# Patient Record
Sex: MALE | Race: WHITE | NOT HISPANIC OR LATINO | URBAN - METROPOLITAN AREA
[De-identification: names, ages, dates, MRNs, and addresses within clinical notes are randomized per-mention and may not be internally consistent; named-entity substitution may affect disease eponyms.]

---

## 2023-02-07 ENCOUNTER — INPATIENT (INPATIENT)
Facility: HOSPITAL | Age: 78
LOS: 0 days | Discharge: HOME | DRG: 287 | End: 2023-02-08
Attending: INTERNAL MEDICINE | Admitting: INTERNAL MEDICINE
Payer: COMMERCIAL

## 2023-02-07 VITALS
WEIGHT: 259.93 LBS | HEIGHT: 70 IN | TEMPERATURE: 98 F | HEART RATE: 48 BPM | DIASTOLIC BLOOD PRESSURE: 91 MMHG | SYSTOLIC BLOOD PRESSURE: 159 MMHG | OXYGEN SATURATION: 99 % | RESPIRATION RATE: 18 BRPM

## 2023-02-07 DIAGNOSIS — I20.0 UNSTABLE ANGINA: ICD-10-CM

## 2023-02-07 LAB
ALBUMIN SERPL ELPH-MCNC: 3.7 G/DL — SIGNIFICANT CHANGE UP (ref 3.3–5)
ALP SERPL-CCNC: 92 U/L — SIGNIFICANT CHANGE UP (ref 40–120)
ALT FLD-CCNC: 12 U/L — SIGNIFICANT CHANGE UP (ref 10–45)
ANION GAP SERPL CALC-SCNC: 9 MMOL/L — SIGNIFICANT CHANGE UP (ref 5–17)
APTT BLD: 27.3 SEC — LOW (ref 27.5–35.5)
AST SERPL-CCNC: 11 U/L — SIGNIFICANT CHANGE UP (ref 10–40)
BASOPHILS # BLD AUTO: 0.02 K/UL — SIGNIFICANT CHANGE UP (ref 0–0.2)
BASOPHILS NFR BLD AUTO: 0.2 % — SIGNIFICANT CHANGE UP (ref 0–2)
BILIRUB SERPL-MCNC: 1 MG/DL — SIGNIFICANT CHANGE UP (ref 0.2–1.2)
BUN SERPL-MCNC: 20 MG/DL — SIGNIFICANT CHANGE UP (ref 7–23)
CALCIUM SERPL-MCNC: 9.3 MG/DL — SIGNIFICANT CHANGE UP (ref 8.4–10.5)
CHLORIDE SERPL-SCNC: 98 MMOL/L — SIGNIFICANT CHANGE UP (ref 96–108)
CO2 SERPL-SCNC: 31 MMOL/L — SIGNIFICANT CHANGE UP (ref 22–31)
CREAT SERPL-MCNC: 1.16 MG/DL — SIGNIFICANT CHANGE UP (ref 0.5–1.3)
EGFR: 64 ML/MIN/1.73M2 — SIGNIFICANT CHANGE UP
EOSINOPHIL # BLD AUTO: 0.08 K/UL — SIGNIFICANT CHANGE UP (ref 0–0.5)
EOSINOPHIL NFR BLD AUTO: 0.9 % — SIGNIFICANT CHANGE UP (ref 0–6)
GLUCOSE BLDC GLUCOMTR-MCNC: 122 MG/DL — HIGH (ref 70–99)
GLUCOSE SERPL-MCNC: 92 MG/DL — SIGNIFICANT CHANGE UP (ref 70–99)
HCT VFR BLD CALC: 40.8 % — SIGNIFICANT CHANGE UP (ref 39–50)
HGB BLD-MCNC: 13.4 G/DL — SIGNIFICANT CHANGE UP (ref 13–17)
IMM GRANULOCYTES NFR BLD AUTO: 0.4 % — SIGNIFICANT CHANGE UP (ref 0–0.9)
INR BLD: 1.12 RATIO — SIGNIFICANT CHANGE UP (ref 0.88–1.16)
LIDOCAIN IGE QN: 11 U/L — SIGNIFICANT CHANGE UP (ref 7–60)
LYMPHOCYTES # BLD AUTO: 1.65 K/UL — SIGNIFICANT CHANGE UP (ref 1–3.3)
LYMPHOCYTES # BLD AUTO: 19.5 % — SIGNIFICANT CHANGE UP (ref 13–44)
MAGNESIUM SERPL-MCNC: 1.9 MG/DL — SIGNIFICANT CHANGE UP (ref 1.6–2.6)
MCHC RBC-ENTMCNC: 30.5 PG — SIGNIFICANT CHANGE UP (ref 27–34)
MCHC RBC-ENTMCNC: 32.8 GM/DL — SIGNIFICANT CHANGE UP (ref 32–36)
MCV RBC AUTO: 92.7 FL — SIGNIFICANT CHANGE UP (ref 80–100)
MONOCYTES # BLD AUTO: 0.76 K/UL — SIGNIFICANT CHANGE UP (ref 0–0.9)
MONOCYTES NFR BLD AUTO: 9 % — SIGNIFICANT CHANGE UP (ref 2–14)
NEUTROPHILS # BLD AUTO: 5.9 K/UL — SIGNIFICANT CHANGE UP (ref 1.8–7.4)
NEUTROPHILS NFR BLD AUTO: 70 % — SIGNIFICANT CHANGE UP (ref 43–77)
NRBC # BLD: 0 /100 WBCS — SIGNIFICANT CHANGE UP (ref 0–0)
NT-PROBNP SERPL-SCNC: 476 PG/ML — HIGH (ref 0–300)
PLATELET # BLD AUTO: 187 K/UL — SIGNIFICANT CHANGE UP (ref 150–400)
POTASSIUM SERPL-MCNC: 3.8 MMOL/L — SIGNIFICANT CHANGE UP (ref 3.5–5.3)
POTASSIUM SERPL-SCNC: 3.8 MMOL/L — SIGNIFICANT CHANGE UP (ref 3.5–5.3)
PROT SERPL-MCNC: 6.7 G/DL — SIGNIFICANT CHANGE UP (ref 6–8.3)
PROTHROM AB SERPL-ACNC: 13 SEC — SIGNIFICANT CHANGE UP (ref 10.5–13.4)
RBC # BLD: 4.4 M/UL — SIGNIFICANT CHANGE UP (ref 4.2–5.8)
RBC # FLD: 12.5 % — SIGNIFICANT CHANGE UP (ref 10.3–14.5)
SARS-COV-2 RNA SPEC QL NAA+PROBE: SIGNIFICANT CHANGE UP
SODIUM SERPL-SCNC: 138 MMOL/L — SIGNIFICANT CHANGE UP (ref 135–145)
TROPONIN T, HIGH SENSITIVITY RESULT: 13 NG/L — SIGNIFICANT CHANGE UP (ref 0–51)
WBC # BLD: 8.44 K/UL — SIGNIFICANT CHANGE UP (ref 3.8–10.5)
WBC # FLD AUTO: 8.44 K/UL — SIGNIFICANT CHANGE UP (ref 3.8–10.5)

## 2023-02-07 PROCEDURE — 99285 EMERGENCY DEPT VISIT HI MDM: CPT | Mod: FS

## 2023-02-07 PROCEDURE — 71045 X-RAY EXAM CHEST 1 VIEW: CPT | Mod: 26

## 2023-02-07 RX ORDER — ATORVASTATIN CALCIUM 80 MG/1
1 TABLET, FILM COATED ORAL
Qty: 0 | Refills: 0 | DISCHARGE

## 2023-02-07 RX ORDER — GLUCAGON INJECTION, SOLUTION 0.5 MG/.1ML
1 INJECTION, SOLUTION SUBCUTANEOUS ONCE
Refills: 0 | Status: DISCONTINUED | OUTPATIENT
Start: 2023-02-07 | End: 2023-02-08

## 2023-02-07 RX ORDER — ASPIRIN/CALCIUM CARB/MAGNESIUM 324 MG
162 TABLET ORAL ONCE
Refills: 0 | Status: COMPLETED | OUTPATIENT
Start: 2023-02-07 | End: 2023-02-07

## 2023-02-07 RX ORDER — TAMSULOSIN HYDROCHLORIDE 0.4 MG/1
1 CAPSULE ORAL
Qty: 0 | Refills: 0 | DISCHARGE

## 2023-02-07 RX ORDER — PANTOPRAZOLE SODIUM 20 MG/1
40 TABLET, DELAYED RELEASE ORAL
Refills: 0 | Status: DISCONTINUED | OUTPATIENT
Start: 2023-02-07 | End: 2023-02-08

## 2023-02-07 RX ORDER — BENZOCAINE AND MENTHOL 5; 1 G/100ML; G/100ML
1 LIQUID ORAL EVERY 6 HOURS
Refills: 0 | Status: DISCONTINUED | OUTPATIENT
Start: 2023-02-07 | End: 2023-02-08

## 2023-02-07 RX ORDER — SODIUM CHLORIDE 9 MG/ML
1000 INJECTION, SOLUTION INTRAVENOUS
Refills: 0 | Status: DISCONTINUED | OUTPATIENT
Start: 2023-02-07 | End: 2023-02-08

## 2023-02-07 RX ORDER — ALPRAZOLAM 0.25 MG
0.5 TABLET ORAL THREE TIMES A DAY
Refills: 0 | Status: DISCONTINUED | OUTPATIENT
Start: 2023-02-07 | End: 2023-02-08

## 2023-02-07 RX ORDER — INFLUENZA VIRUS VACCINE 15; 15; 15; 15 UG/.5ML; UG/.5ML; UG/.5ML; UG/.5ML
0.7 SUSPENSION INTRAMUSCULAR ONCE
Refills: 0 | Status: DISCONTINUED | OUTPATIENT
Start: 2023-02-07 | End: 2023-02-08

## 2023-02-07 RX ORDER — DEXTROSE 50 % IN WATER 50 %
25 SYRINGE (ML) INTRAVENOUS ONCE
Refills: 0 | Status: DISCONTINUED | OUTPATIENT
Start: 2023-02-07 | End: 2023-02-08

## 2023-02-07 RX ORDER — DESVENLAFAXINE 50 MG/1
50 TABLET, EXTENDED RELEASE ORAL DAILY
Refills: 0 | Status: DISCONTINUED | OUTPATIENT
Start: 2023-02-07 | End: 2023-02-08

## 2023-02-07 RX ORDER — AMLODIPINE BESYLATE 2.5 MG/1
1 TABLET ORAL
Qty: 0 | Refills: 0 | DISCHARGE

## 2023-02-07 RX ORDER — FINASTERIDE 5 MG/1
1 TABLET, FILM COATED ORAL
Qty: 0 | Refills: 0 | DISCHARGE

## 2023-02-07 RX ORDER — ATORVASTATIN CALCIUM 80 MG/1
20 TABLET, FILM COATED ORAL AT BEDTIME
Refills: 0 | Status: DISCONTINUED | OUTPATIENT
Start: 2023-02-07 | End: 2023-02-08

## 2023-02-07 RX ORDER — IPRATROPIUM/ALBUTEROL SULFATE 18-103MCG
3 AEROSOL WITH ADAPTER (GRAM) INHALATION ONCE
Refills: 0 | Status: COMPLETED | OUTPATIENT
Start: 2023-02-07 | End: 2023-02-07

## 2023-02-07 RX ORDER — DEXTROSE 50 % IN WATER 50 %
15 SYRINGE (ML) INTRAVENOUS ONCE
Refills: 0 | Status: DISCONTINUED | OUTPATIENT
Start: 2023-02-07 | End: 2023-02-08

## 2023-02-07 RX ORDER — DESVENLAFAXINE 50 MG/1
1 TABLET, EXTENDED RELEASE ORAL
Qty: 0 | Refills: 0 | DISCHARGE

## 2023-02-07 RX ORDER — DEXTROSE 50 % IN WATER 50 %
12.5 SYRINGE (ML) INTRAVENOUS ONCE
Refills: 0 | Status: DISCONTINUED | OUTPATIENT
Start: 2023-02-07 | End: 2023-02-08

## 2023-02-07 RX ORDER — TAMSULOSIN HYDROCHLORIDE 0.4 MG/1
0.4 CAPSULE ORAL AT BEDTIME
Refills: 0 | Status: DISCONTINUED | OUTPATIENT
Start: 2023-02-07 | End: 2023-02-08

## 2023-02-07 RX ORDER — METHYLPREDNISOLONE 4 MG
500 TABLET ORAL AT BEDTIME
Refills: 0 | Status: DISCONTINUED | OUTPATIENT
Start: 2023-02-07 | End: 2023-02-08

## 2023-02-07 RX ORDER — ASPIRIN/CALCIUM CARB/MAGNESIUM 324 MG
81 TABLET ORAL DAILY
Refills: 0 | Status: DISCONTINUED | OUTPATIENT
Start: 2023-02-07 | End: 2023-02-08

## 2023-02-07 RX ORDER — INSULIN LISPRO 100/ML
VIAL (ML) SUBCUTANEOUS AT BEDTIME
Refills: 0 | Status: DISCONTINUED | OUTPATIENT
Start: 2023-02-07 | End: 2023-02-08

## 2023-02-07 RX ORDER — AMLODIPINE BESYLATE 2.5 MG/1
2.5 TABLET ORAL DAILY
Refills: 0 | Status: DISCONTINUED | OUTPATIENT
Start: 2023-02-07 | End: 2023-02-08

## 2023-02-07 RX ORDER — TERAZOSIN HYDROCHLORIDE 10 MG/1
1 CAPSULE ORAL
Qty: 0 | Refills: 0 | DISCHARGE

## 2023-02-07 RX ORDER — ALPRAZOLAM 0.25 MG
1 TABLET ORAL
Qty: 0 | Refills: 0 | DISCHARGE

## 2023-02-07 RX ORDER — LANSOPRAZOLE 15 MG/1
0 CAPSULE, DELAYED RELEASE ORAL
Qty: 0 | Refills: 0 | DISCHARGE

## 2023-02-07 RX ORDER — DOXAZOSIN MESYLATE 4 MG
1 TABLET ORAL AT BEDTIME
Refills: 0 | Status: DISCONTINUED | OUTPATIENT
Start: 2023-02-07 | End: 2023-02-08

## 2023-02-07 RX ORDER — HYDROCHLOROTHIAZIDE 25 MG
1 TABLET ORAL
Qty: 0 | Refills: 0 | DISCHARGE

## 2023-02-07 RX ORDER — ASPIRIN/CALCIUM CARB/MAGNESIUM 324 MG
1 TABLET ORAL
Qty: 0 | Refills: 0 | DISCHARGE

## 2023-02-07 RX ORDER — FINASTERIDE 5 MG/1
5 TABLET, FILM COATED ORAL DAILY
Refills: 0 | Status: DISCONTINUED | OUTPATIENT
Start: 2023-02-07 | End: 2023-02-08

## 2023-02-07 RX ORDER — PREGABALIN 225 MG/1
1000 CAPSULE ORAL DAILY
Refills: 0 | Status: DISCONTINUED | OUTPATIENT
Start: 2023-02-07 | End: 2023-02-08

## 2023-02-07 RX ORDER — INSULIN LISPRO 100/ML
VIAL (ML) SUBCUTANEOUS
Refills: 0 | Status: DISCONTINUED | OUTPATIENT
Start: 2023-02-07 | End: 2023-02-08

## 2023-02-07 RX ORDER — PREGABALIN 225 MG/1
1500 CAPSULE ORAL
Qty: 0 | Refills: 0 | DISCHARGE

## 2023-02-07 RX ORDER — METHYLPREDNISOLONE 4 MG
1 TABLET ORAL
Qty: 0 | Refills: 0 | DISCHARGE

## 2023-02-07 RX ORDER — GLIMEPIRIDE 1 MG
1 TABLET ORAL
Qty: 0 | Refills: 0 | DISCHARGE

## 2023-02-07 RX ADMIN — Medication 3 MILLILITER(S): at 16:49

## 2023-02-07 RX ADMIN — ATORVASTATIN CALCIUM 20 MILLIGRAM(S): 80 TABLET, FILM COATED ORAL at 22:59

## 2023-02-07 RX ADMIN — TAMSULOSIN HYDROCHLORIDE 0.4 MILLIGRAM(S): 0.4 CAPSULE ORAL at 22:59

## 2023-02-07 RX ADMIN — Medication 162 MILLIGRAM(S): at 16:49

## 2023-02-07 NOTE — H&P CARDIOLOGY - NSICDXPASTMEDICALHX_GEN_ALL_CORE_FT
PAST MEDICAL HISTORY:  Anxiety     DM type 2, goal HbA1c < 7%     History of BPH     HLD (hyperlipidemia)     HTN (hypertension)

## 2023-02-07 NOTE — PATIENT PROFILE ADULT - FALL HARM RISK - HARM RISK INTERVENTIONS
Assistance with ambulation/Assistance OOB with selected safe patient handling equipment/Communicate Risk of Fall with Harm to all staff/Discuss with provider need for PT consult/Monitor gait and stability/Reinforce activity limits and safety measures with patient and family/Tailored Fall Risk Interventions/Visual Cue: Yellow wristband and red socks/Bed in lowest position, wheels locked, appropriate side rails in place/Call bell, personal items and telephone in reach/Instruct patient to call for assistance before getting out of bed or chair/Non-slip footwear when patient is out of bed/[place] to call system/Physically safe environment - no spills, clutter or unnecessary equipment/Purposeful Proactive Rounding/Room/bathroom lighting operational, light cord in reach

## 2023-02-07 NOTE — ED ADULT NURSE NOTE - OBJECTIVE STATEMENT
Pt presented to ed sent from PCP office with c/o SOB on exertion that started 2 days ago. pt denies nausea, vomiting, fever, chills, chest pain, headache, dizziness. Pt presented to ed sent from PCP office with c/o chest pain and SOB on exertion that started 2 days ago. As per EMS, pt. was given 1 sublingual nitroglycerin at the doctor's office which resolved the pain. pt denies nausea, vomiting, fever, chills, chest pain, headache, dizziness. Pt presented to ed sent from PCP office with c/o chest pain and SOB on exertion that started 2 days ago. As per EMS, pt. was given 1 sublingual nitroglycerin at the doctor's office which resolved pain. pt denies nausea, vomiting, fever, chills, chest pain, headache, dizziness.

## 2023-02-07 NOTE — ED PROVIDER NOTE - PHYSICAL EXAMINATION
CONSTITUTIONAL: Patient is awake, alert and oriented x 3. Patient is well appearing on 2L nasal canula  in no acute distress  HEAD: NCAT  NECK: supple, FROM  LUNGS: Diffuse mild expiratory wheezing   HEART: RRR.+S1S2 no murmurs  ABDOMEN: Soft, non-distended, nttp,  no rebound or guarding  EXTREMITY: b/l LE lymphedema, no calf tenderness b/l, FROM upper and lower ext b/l  SKIN: with no rash or lesions  NEURO: No focal deficits

## 2023-02-07 NOTE — ED PROVIDER NOTE - CLINICAL SUMMARY MEDICAL DECISION MAKING FREE TEXT BOX
Impression and plan: Mild hypoxia in the context of COPD with active smoking and chest pain is concerning for ACS.  ECG shows sinus bradycardia with old bifascicular block, which is also concerning for ischemia.  Therefore: Cardiac enzymes chest x-ray proBNP cardiology consult.    Case discussed at length with cardiology team and they feel that his description of pain and modifiers as well as risk factors warrant urgent cardiac angiogram.  Patient will be admitted to the cardiology for cath.  ECG independently reviewed by me which shows sinus bradycardia, with heart rate of 52, bifascicular block.

## 2023-02-07 NOTE — ED PROVIDER NOTE - ATTENDING APP SHARED VISIT CONTRIBUTION OF CARE
MD Salazar:  patient seen and evaluated with the PA.  I was present for key portions of the History and Physical, and I agree with the Impression and Plan.  77-year-old male, sent to the ED by primary cardiologist for evaluation of shortness of breath.  Patient has known CHF, COPD chronic lymphedema.  O2 sat noted to be 90% on room air in her office, patient also complaining of chest pain that responded to 1 sublingual nitroglycerin.  Sent to the [facility] ED for evaluation and/or catheterization by Dr. Benjamin Zelaya.  ECG shows sinus bradycardia, rate 52 bifascicular block.    Vital signs: Heart rate high 40s low 50s, normotensive, O2 sat 90% on room air  Physical exam: Elderly male, morbidly obese, patient with severe bipedal lymphedema up to mid thigh.  Lungs: Bilateral wheezing throughout  Cardiac bradycardia appreciated no appreciable murmurs abdomen soft nondistended nontender  Neuro: Alert and oriented x4, hard of hearing  Psychiatric: Appropriate  Impression and plan: Mild hypoxia in the context of COPD with active smoking and chest pain is concerning for ACS.  ECG shows sinus bradycardia with old bifascicular block, which is also concerning for ischemia.  Therefore: Cardiac enzymes chest x-ray proBNP cardiology consult.    Case discussed at length with cardiology team and they feel that his description of pain and modifiers as well as risk factors warrant urgent cardiac angiogram.  Patient will be admitted to the cardiology for cath.  ECG independently reviewed by me which shows sinus bradycardia, with heart rate of 52, bifascicular block.

## 2023-02-07 NOTE — ED ADULT TRIAGE NOTE - PRO INTERPRETER NEED 2
Based on the patients symptoms and/or clinical findings the procedure is clinically indicated and should not be delayed due to Covid 19.
English

## 2023-02-07 NOTE — CHART NOTE - NSCHARTNOTEFT_GEN_A_CORE
Initial plan for [facility] today with Dr. Klein however covid swab not resulted due to mix up.    Case to be moved to tomorrow morning.    Please send to [facility] once a bed is available    Shimon Montes PGY4  Cardiology Fellow

## 2023-02-07 NOTE — ED PROVIDER NOTE - OBJECTIVE STATEMENT
77-year-old male with past medical history of hypertension, hyperlipidemia, diabetes, CHF, COPD presents the emergency department via EMS from his cardiologist office for evaluation of chest pain and dyspnea.  Patient reports that he has had 2 days diffuse chest pain and increased exertional shortness of breath from his baseline.  He last saw his cardiologist a month ago and was in his normal state of health.  Today at follow-up appointment after evaluation was sent to ED for evaluation with interventional cardiology for work-up of ACS. Patient received 1 sublingual nitroglycerin which resolved pain in route.  He denies fevers, chills, cough, abdominal pain, nausea, vomiting.

## 2023-02-07 NOTE — ED ADULT NURSE NOTE - NSIMPLEMENTINTERV_GEN_ALL_ED
Implemented All Fall Risk Interventions:  [place] to call system. Call bell, personal items and telephone within reach. Instruct patient to call for assistance. Room bathroom lighting operational. Non-slip footwear when patient is off stretcher. Physically safe environment: no spills, clutter or unnecessary equipment. Stretcher in lowest position, wheels locked, appropriate side rails in place. Provide visual cue, wrist band, yellow gown, etc. Monitor gait and stability. Monitor for mental status changes and reorient to person, place, and time. Review medications for side effects contributing to fall risk. Reinforce activity limits and safety measures with patient and family.

## 2023-02-07 NOTE — H&P CARDIOLOGY - HISTORY OF PRESENT ILLNESS
77-year-old male with past medical history of hypertension, hyperlipidemia, diabetes, CHF, COPD, presents to the [facility] emergency department via EMS from his cardiologist office on 2/7/23 for evaluation of chest pain and dyspnea.  Patient reports that he has had 2 days diffuse chest pain and increased exertional shortness of breath from his baseline.  He last saw his cardiologist a month ago and was in his normal state of health.  Today at follow-up appointment after evaluation was sent to ED for evaluation with interventional cardiology for work-up of ACS. Patient received 1 sublingual nitroglycerin which resolved pain in route.  He denies fevers, chills, cough, abdominal pain, nausea, vomiting. Admitted to [facility] bed #11. 77-year-old male with past medical history of hypertension, hyperlipidemia, diabetes, CHF, COPD, cigarette smoker (quit 2 weeks ago), presents to the [facility] emergency department via EMS from his cardiologist office on 2/7/23 for evaluation of chest pain and dyspnea.  Patient reports that he has had 2 days diffuse chest pain and increased exertional shortness of breath from his baseline.  He last saw his cardiologist a month ago and was in his normal state of health.  Today at follow-up appointment after evaluation was sent to ED for evaluation with interventional cardiology for work-up of ACS. Patient received 1 sublingual nitroglycerin which resolved pain in route.  He denies fevers, chills, cough, abdominal pain, nausea, vomiting. Admitted to [facility], [facility] bed #11.

## 2023-02-08 ENCOUNTER — TRANSCRIPTION ENCOUNTER (OUTPATIENT)
Age: 78
End: 2023-02-08

## 2023-02-08 VITALS
RESPIRATION RATE: 20 BRPM | OXYGEN SATURATION: 93 % | SYSTOLIC BLOOD PRESSURE: 152 MMHG | HEART RATE: 52 BPM | DIASTOLIC BLOOD PRESSURE: 59 MMHG | TEMPERATURE: 99 F

## 2023-02-08 DIAGNOSIS — E78.5 HYPERLIPIDEMIA, UNSPECIFIED: ICD-10-CM

## 2023-02-08 DIAGNOSIS — E11.9 TYPE 2 DIABETES MELLITUS WITHOUT COMPLICATIONS: ICD-10-CM

## 2023-02-08 DIAGNOSIS — R07.9 CHEST PAIN, UNSPECIFIED: ICD-10-CM

## 2023-02-08 DIAGNOSIS — I10 ESSENTIAL (PRIMARY) HYPERTENSION: ICD-10-CM

## 2023-02-08 LAB
A1C WITH ESTIMATED AVERAGE GLUCOSE RESULT: 5.6 % — SIGNIFICANT CHANGE UP (ref 4–5.6)
CHOLEST SERPL-MCNC: 112 MG/DL — SIGNIFICANT CHANGE UP
ESTIMATED AVERAGE GLUCOSE: 114 MG/DL — SIGNIFICANT CHANGE UP (ref 68–114)
GLUCOSE BLDC GLUCOMTR-MCNC: 110 MG/DL — HIGH (ref 70–99)
GLUCOSE BLDC GLUCOMTR-MCNC: 142 MG/DL — HIGH (ref 70–99)
GLUCOSE BLDC GLUCOMTR-MCNC: 146 MG/DL — HIGH (ref 70–99)
HDLC SERPL-MCNC: 41 MG/DL — SIGNIFICANT CHANGE UP
LIPID PNL WITH DIRECT LDL SERPL: 55 MG/DL — SIGNIFICANT CHANGE UP
NON HDL CHOLESTEROL: 70 MG/DL — SIGNIFICANT CHANGE UP
TRIGL SERPL-MCNC: 76 MG/DL — SIGNIFICANT CHANGE UP
TROPONIN T, HIGH SENSITIVITY RESULT: 14 NG/L — SIGNIFICANT CHANGE UP (ref 0–51)

## 2023-02-08 PROCEDURE — 93005 ELECTROCARDIOGRAM TRACING: CPT

## 2023-02-08 PROCEDURE — 93306 TTE W/DOPPLER COMPLETE: CPT | Mod: 26

## 2023-02-08 PROCEDURE — 84484 ASSAY OF TROPONIN QUANT: CPT

## 2023-02-08 PROCEDURE — C1887: CPT

## 2023-02-08 PROCEDURE — 83036 HEMOGLOBIN GLYCOSYLATED A1C: CPT

## 2023-02-08 PROCEDURE — 71045 X-RAY EXAM CHEST 1 VIEW: CPT

## 2023-02-08 PROCEDURE — C1769: CPT

## 2023-02-08 PROCEDURE — 94640 AIRWAY INHALATION TREATMENT: CPT

## 2023-02-08 PROCEDURE — 85610 PROTHROMBIN TIME: CPT

## 2023-02-08 PROCEDURE — 82962 GLUCOSE BLOOD TEST: CPT

## 2023-02-08 PROCEDURE — 80061 LIPID PANEL: CPT

## 2023-02-08 PROCEDURE — C1894: CPT

## 2023-02-08 PROCEDURE — 99152 MOD SED SAME PHYS/QHP 5/>YRS: CPT

## 2023-02-08 PROCEDURE — 83735 ASSAY OF MAGNESIUM: CPT

## 2023-02-08 PROCEDURE — 85025 COMPLETE CBC W/AUTO DIFF WBC: CPT

## 2023-02-08 PROCEDURE — 80053 COMPREHEN METABOLIC PANEL: CPT

## 2023-02-08 PROCEDURE — 93458 L HRT ARTERY/VENTRICLE ANGIO: CPT

## 2023-02-08 PROCEDURE — 93010 ELECTROCARDIOGRAM REPORT: CPT

## 2023-02-08 PROCEDURE — U0003: CPT

## 2023-02-08 PROCEDURE — 93458 L HRT ARTERY/VENTRICLE ANGIO: CPT | Mod: 26

## 2023-02-08 PROCEDURE — 93306 TTE W/DOPPLER COMPLETE: CPT

## 2023-02-08 PROCEDURE — 85730 THROMBOPLASTIN TIME PARTIAL: CPT

## 2023-02-08 PROCEDURE — 83690 ASSAY OF LIPASE: CPT

## 2023-02-08 PROCEDURE — 83880 ASSAY OF NATRIURETIC PEPTIDE: CPT

## 2023-02-08 PROCEDURE — 99285 EMERGENCY DEPT VISIT HI MDM: CPT

## 2023-02-08 RX ORDER — METOPROLOL TARTRATE 50 MG
25 TABLET ORAL
Refills: 0 | Status: DISCONTINUED | OUTPATIENT
Start: 2023-02-08 | End: 2023-02-08

## 2023-02-08 RX ORDER — IPRATROPIUM/ALBUTEROL SULFATE 18-103MCG
3 AEROSOL WITH ADAPTER (GRAM) INHALATION ONCE
Refills: 0 | Status: COMPLETED | OUTPATIENT
Start: 2023-02-08 | End: 2023-02-08

## 2023-02-08 RX ADMIN — PREGABALIN 1000 MICROGRAM(S): 225 CAPSULE ORAL at 11:37

## 2023-02-08 RX ADMIN — Medication 100 MILLIGRAM(S): at 04:49

## 2023-02-08 RX ADMIN — Medication 3 MILLILITER(S): at 08:44

## 2023-02-08 RX ADMIN — AMLODIPINE BESYLATE 2.5 MILLIGRAM(S): 2.5 TABLET ORAL at 04:49

## 2023-02-08 RX ADMIN — FINASTERIDE 5 MILLIGRAM(S): 5 TABLET, FILM COATED ORAL at 11:36

## 2023-02-08 RX ADMIN — DESVENLAFAXINE 50 MILLIGRAM(S): 50 TABLET, EXTENDED RELEASE ORAL at 11:36

## 2023-02-08 RX ADMIN — BENZOCAINE AND MENTHOL 1 LOZENGE: 5; 1 LIQUID ORAL at 04:49

## 2023-02-08 RX ADMIN — PANTOPRAZOLE SODIUM 40 MILLIGRAM(S): 20 TABLET, DELAYED RELEASE ORAL at 04:50

## 2023-02-08 RX ADMIN — Medication 81 MILLIGRAM(S): at 04:49

## 2023-02-08 NOTE — DISCHARGE NOTE PROVIDER - CARE PROVIDER_API CALL
Christiano Klein)  Cardiovascular Disease; Interventional Cardiology  [address] [place] [address]  Phone: [phone]  Fax: [phone]  Established Patient  Follow Up Time: 2 weeks

## 2023-02-08 NOTE — DISCHARGE NOTE NURSING/CASE MANAGEMENT/SOCIAL WORK - PATIENT PORTAL LINK FT
You can access the FollowMyHealth Patient Portal offered by [facility] by registering at the following website: [email or web address]. By joining [employer]’s FollowMyHealth portal, you will also be able to view your health information using other applications (apps) compatible with our system.

## 2023-02-08 NOTE — DISCHARGE NOTE NURSING/CASE MANAGEMENT/SOCIAL WORK - NSDCPEFALRISK_GEN_ALL_CORE
For information on Fall & Injury Prevention, visit: [email or web address] OR  [email or web address] OR  https://www.cdc.gov/steadi/patient.html

## 2023-02-08 NOTE — PROGRESS NOTE ADULT - SUBJECTIVE AND OBJECTIVE BOX
Patient is a 77y old  Male who presents with a chief complaint of chest pain      Allergies    No Known Allergies    Intolerances        Medications:  ALPRAZolam 0.5 milliGRAM(s) Oral three times a day PRN  amLODIPine   Tablet 2.5 milliGRAM(s) Oral daily  aspirin enteric coated 81 milliGRAM(s) Oral daily  atorvastatin 20 milliGRAM(s) Oral at bedtime  benzocaine/menthol Lozenge 1 Lozenge Oral every 6 hours PRN  cyanocobalamin 1000 MICROGram(s) Oral daily  desvenlafaxine ER 50 milliGRAM(s) Oral daily  dextrose 5%. 1000 milliLiter(s) IV Continuous <Continuous>  dextrose 5%. 1000 milliLiter(s) IV Continuous <Continuous>  dextrose 50% Injectable 25 Gram(s) IV Push once  dextrose 50% Injectable 12.5 Gram(s) IV Push once  dextrose 50% Injectable 25 Gram(s) IV Push once  dextrose Oral Gel 15 Gram(s) Oral once PRN  doxazosin 1 milliGRAM(s) Oral at bedtime  finasteride 5 milliGRAM(s) Oral daily  glucagon  Injectable 1 milliGRAM(s) IntraMuscular once  guaiFENesin Oral Liquid (Sugar-Free) 100 milliGRAM(s) Oral every 6 hours PRN  hydrochlorothiazide 25 milliGRAM(s) Oral daily  influenza  Vaccine (HIGH DOSE) 0.7 milliLiter(s) IntraMuscular once  insulin lispro (ADMELOG) corrective regimen sliding scale   SubCutaneous three times a day before meals  insulin lispro (ADMELOG) corrective regimen sliding scale   SubCutaneous at bedtime  magnesium gluconate 500 milliGRAM(s) Oral at bedtime  pantoprazole    Tablet 40 milliGRAM(s) Oral before breakfast  tamsulosin 0.4 milliGRAM(s) Oral at bedtime      Vitals:  T(C): 36.9 (23 @ 21:10), Max: 36.9 (23 @ 21:10)  HR: 45 (23 @ 21:10) (45 - 51)  BP: 155/66 (23 @ 21:10) (148/63 - 159/91)  BP(mean): 94 (23 @ 21:10) (94 - 94)  RR: 17 (23 @ 21:10) (17 - 18)  SpO2: 98% (23 @ 21:10) (97% - 99%)  Wt(kg): --  Daily Height in cm: 177.8 (2023 22:15)    Daily Weight in k.9 (2023 22:15)  I&O's Summary        Physical Exam:  Appearance: Normal  Eyes: PERRL, EOMI  HENT: Normal oral [place], NC/AT  Cardiovascular: S1S2, RRR, No M/R/G, no JVD, No Lower extremity edema  Procedural Access Site: No hematoma, Non-tender to palpation, 2+ pulse, No bruit, No Ecchymosis  Respiratory: Clear to auscultation bilaterally  Gastrointestinal: Soft, Non tender, Normal Bowel Sounds  Musculoskeletal: No clubbing, No joint deformity   Neurologic: Non-focal  Lymphatic: No lymphadenopathy  Psychiatry: AAOx3, Mood & affect appropriate  Skin: No rashes, No ecchymoses, No cyanosis        138  |  98  |  20  ----------------------------<  92  3.8   |  31  |  1.16    Ca    9.3      2023 16:46  Mg     1.9         TPro  6.7  /  Alb  3.7  /  TBili  1.0  /  DBili  x   /  AST  11  /  ALT  12  /  AlkPhos  92      PT/INR - ( 2023 16:46 )   PT: 13.0 sec;   INR: 1.12 ratio         PTT - ( 2023 16:46 )  PTT:27.3 sec      Serum Pro-Brain Natriuretic Peptide: 476 pg/mL ( @ 16:46)    Lipid panel   Hgb A1c                         13.4   8.44  )-----------( 187      ( 2023 16:46 )             40.8          77-year-old male with past medical history of hypertension, hyperlipidemia, diabetes, CHF, COPD, presents to the [facility] emergency department via EMS from his cardiologist office on 23 for evaluation of chest pain and dyspnea.  Patient reports that he has had 2 days diffuse chest pain and increased exertional shortness of breath from his baseline.  He last saw his cardiologist a month ago and was in his normal state of health.  Today at follow-up appointment after evaluation was sent to ED for evaluation with interventional cardiology for work-up of ACS. Patient received 1 sublingual nitroglycerin which resolved pain in route.  He denies fevers, chills, cough, abdominal pain, nausea, vomiting. Admitted to [facility] bed #11.        Allergies    No Known Allergies    Intolerances        Medications:  ALPRAZolam 0.5 milliGRAM(s) Oral three times a day PRN  amLODIPine   Tablet 2.5 milliGRAM(s) Oral daily  aspirin enteric coated 81 milliGRAM(s) Oral daily  atorvastatin 20 milliGRAM(s) Oral at bedtime  benzocaine/menthol Lozenge 1 Lozenge Oral every 6 hours PRN  cyanocobalamin 1000 MICROGram(s) Oral daily  desvenlafaxine ER 50 milliGRAM(s) Oral daily  dextrose 5%. 1000 milliLiter(s) IV Continuous <Continuous>  dextrose 5%. 1000 milliLiter(s) IV Continuous <Continuous>  dextrose 50% Injectable 25 Gram(s) IV Push once  dextrose 50% Injectable 12.5 Gram(s) IV Push once  dextrose 50% Injectable 25 Gram(s) IV Push once  dextrose Oral Gel 15 Gram(s) Oral once PRN  doxazosin 1 milliGRAM(s) Oral at bedtime  finasteride 5 milliGRAM(s) Oral daily  glucagon  Injectable 1 milliGRAM(s) IntraMuscular once  guaiFENesin Oral Liquid (Sugar-Free) 100 milliGRAM(s) Oral every 6 hours PRN  hydrochlorothiazide 25 milliGRAM(s) Oral daily  influenza  Vaccine (HIGH DOSE) 0.7 milliLiter(s) IntraMuscular once  insulin lispro (ADMELOG) corrective regimen sliding scale   SubCutaneous three times a day before meals  insulin lispro (ADMELOG) corrective regimen sliding scale   SubCutaneous at bedtime  magnesium gluconate 500 milliGRAM(s) Oral at bedtime  pantoprazole    Tablet 40 milliGRAM(s) Oral before breakfast  tamsulosin 0.4 milliGRAM(s) Oral at bedtime      Vitals:  T(C): 36.9 (23 @ 21:10), Max: 36.9 (23 @ 21:10)  HR: 45 (23 @ 21:10) (45 - 51)  BP: 155/66 (23 @ 21:10) (148/63 - 159/91)  BP(mean): 94 (23 @ 21:10) (94 - 94)  RR: 17 (23 @ 21:10) (17 - 18)  SpO2: 98% (23 @ 21:10) (97% - 99%)  Wt(kg): --  Daily Height in cm: 177.8 (2023 22:15)    Daily Weight in k.9 (2023 22:15)  I&O's Summary        Physical Exam:  Appearance: Normal  Eyes: PERRL, EOMI  HENT: Normal oral [place], NC/AT  Cardiovascular: S1S2, RRR, No M/R/G, no JVD, No Lower extremity edema  Procedural Access Site: No hematoma, Non-tender to palpation, 2+ pulse, No bruit, No Ecchymosis  Respiratory: Clear to auscultation bilaterally  Gastrointestinal: Soft, Non tender, Normal Bowel Sounds  Musculoskeletal: No clubbing, No joint deformity   Neurologic: Non-focal  Lymphatic: No lymphadenopathy  Psychiatry: AAOx3, Mood & affect appropriate  Skin: No rashes, No ecchymoses, No cyanosis        138  |  98  |  20  ----------------------------<  92  3.8   |  31  |  1.16    Ca    9.3      2023 16:46  Mg     1.9         TPro  6.7  /  Alb  3.7  /  TBili  1.0  /  DBili  x   /  AST  11  /  ALT  12  /  AlkPhos  92      PT/INR - ( 2023 16:46 )   PT: 13.0 sec;   INR: 1.12 ratio         PTT - ( 2023 16:46 )  PTT:27.3 sec      Serum Pro-Brain Natriuretic Peptide: 476 pg/mL ( @ 16:46)    Lipid panel   Hgb A1c                         13.4   8.44  )-----------( 187       16:46 )             40.8

## 2023-02-08 NOTE — DISCHARGE NOTE PROVIDER - NSDCMRMEDTOKEN_GEN_ALL_CORE_FT
ALPRAZolam 0.5 mg oral tablet: 1 tab(s) orally 3 times a day, As Needed  amLODIPine 2.5 mg oral tablet: 1 tab(s) orally once a day  aspirin 81 mg oral delayed release tablet: 1 tab(s) orally once a day  atorvastatin 20 mg oral tablet: 1 tab(s) orally once a day  finasteride 5 mg oral tablet: 1 tab(s) orally once a day  glimepiride 1 mg oral tablet: 1 tab(s) orally once a day  hydroCHLOROthiazide 25 mg oral tablet: 1 tab(s) orally once a day  lansoprazole 30 mg oral tablet, disintegrating: orally every other day  magnesium gluconate 500 mg oral tablet: 1 tab(s) orally once a day (at bedtime)  Pristiq 50 mg oral tablet, extended release: 1 tab(s) orally once a day  tamsulosin 0.4 mg oral capsule: 1 cap(s) orally once a day (at bedtime)  terazosin 1 mg oral tablet: 1 tab(s) orally once a day (at bedtime)  Vitamin B12: 1500 milligram(s) orally once a day

## 2023-02-08 NOTE — DISCHARGE NOTE PROVIDER - HOSPITAL COURSE
77-year-old male with past medical history of hypertension, hyperlipidemia, diabetes, CHF, COPD, cigarette smoker (quit 2 weeks ago), presents to the [facility] emergency department via EMS from his cardiologist office on 2/7/23 for evaluation of chest pain and dyspnea.  Patient reports that he has had 2 days diffuse chest pain and increased exertional shortness of breath from his baseline.  He last saw his cardiologist a month ago and was in his normal state of health.  Today at follow-up appointment after evaluation was sent to ED for evaluation with interventional cardiology for work-up of ACS. Patient received 1 sublingual nitroglycerin which resolved pain in route.  He denies fevers, chills, cough, abdominal pain, nausea, vomiting.     2/8 cardiac cath- diagnostic access via RRB.     had NSVT unable to start beta blocker given bradycardia  Echo - -  Calcified trileaflet aortic valve with normal opening.  There is an increased peak velocity of 1.8 m/s probably due  to a hyperdynamic ventricle. No aortic valve regurgitation  seen.  2. Aortic Root: 4.2 cm. The sinotubular junction and  proximal ascending aorta are not well visualized.  3. Normal left atrium.  LA volume index = 31 cc/m2.Normal  left ventricular internal dimensions and wall  thicknesses.Normal left ventricular systolic function. No  segmental wall motion abnormalities. LVEF calculated using  biplane Stark's method is 63%.Normal diastolic function.  4. Mild right atrial enlargement.Right ventricular  enlargement with normal right ventricular systolic  function.  5. Tricuspid valve not well visualized. Minimal tricuspid  regurgitation. Estimated pulmonary artery systolic pressure  equals 37  mm Hg, assuming right atrial pressure equals 8  mm Hg, consistent with borderline pulmonary pressures.  6. No pericardial effusion seen.      STABLE FOR DISCHARGE TO HOME AS PER shayy Spencer HE WILL FOLLOW UP WITH HIM OUTPATIENT IN 2-4 weeks 77-year-old male with past medical history of hypertension, hyperlipidemia, diabetes, CHF, COPD, cigarette smoker (quit 2 weeks ago), presents to the [facility] emergency department via EMS from his cardiologist office on 2/7/23 for evaluation of chest pain and dyspnea.  Patient reports that he has had 2 days diffuse chest pain and increased exertional shortness of breath from his baseline.  He last saw his cardiologist a month ago and was in his normal state of health.  Today at follow-up appointment after evaluation was sent to ED for evaluation with interventional cardiology for work-up of ACS. Patient received 1 sublingual nitroglycerin which resolved pain in route.  He denies fevers, chills, cough, abdominal pain, nausea, vomiting.     2/8 cardiac cath- diagnostic access via RRB.     had NSVT unable to start beta blocker given bradycardia  Echo - -  Calcified trileaflet aortic valve with normal opening.  There is an increased peak velocity of 1.8 m/s probably due  to a hyperdynamic ventricle. No aortic valve regurgitation  seen.  2. Aortic Root: 4.2 cm. The sinotubular junction and  proximal ascending aorta are not well visualized.  3. Normal left atrium.  LA volume index = 31 cc/m2.Normal  left ventricular internal dimensions and wall  thicknesses.Normal left ventricular systolic function. No  segmental wall motion abnormalities. LVEF calculated using  biplane Stark's method is 63%.Normal diastolic function.  4. Mild right atrial enlargement.Right ventricular  enlargement with normal right ventricular systolic  function.  5. Tricuspid valve not well visualized. Minimal tricuspid  regurgitation. Estimated pulmonary artery systolic pressure  equals 37  mm Hg, assuming right atrial pressure equals 8  mm Hg, consistent with borderline pulmonary pressures.  6. No pericardial effusion seen.      STABLE FOR DISCHARGE TO HOME AS PER Dr Klein , no beta blocker given bradycardia and no EPS consutl at this time as per Yue.   Follow up with Yue within 2 weeks.

## 2023-02-08 NOTE — DISCHARGE NOTE PROVIDER - NSDCCPCAREPLAN_GEN_ALL_CORE_FT
PRINCIPAL DISCHARGE DIAGNOSIS  Diagnosis: Unstable angina pectoris  Assessment and Plan of Treatment: Patient had EKG, CXR,LABS, tele monitoring for arrythmia . ECHO normal, CXR clear, cardiac cath non obstructive . outpatient follow up with Dr Klein      SECONDARY DISCHARGE DIAGNOSES  Diagnosis: Hypoxia  Assessment and Plan of Treatment:

## 2023-02-08 NOTE — CHART NOTE - NSCHARTNOTEFT_GEN_A_CORE
Patient with normal ECHO and CXR is clear, he is s/p diagnostic cardiac cath. Discharge home as per Dr Klein, no beta blocker given bradycardia. He will follow up with Dr Klein in 2 -4weeks.

## 2023-02-09 PROBLEM — Z00.00 ENCOUNTER FOR PREVENTIVE HEALTH EXAMINATION: Status: ACTIVE | Noted: 2023-02-09

## 2023-04-11 PROBLEM — Z87.438 PERSONAL HISTORY OF OTHER DISEASES OF MALE GENITAL ORGANS: Chronic | Status: ACTIVE | Noted: 2023-02-07

## 2023-04-11 PROBLEM — E11.9 TYPE 2 DIABETES MELLITUS WITHOUT COMPLICATIONS: Chronic | Status: ACTIVE | Noted: 2023-02-07

## 2023-04-11 PROBLEM — F41.9 ANXIETY DISORDER, UNSPECIFIED: Chronic | Status: ACTIVE | Noted: 2023-02-07

## 2023-04-11 PROBLEM — E78.5 HYPERLIPIDEMIA, UNSPECIFIED: Chronic | Status: ACTIVE | Noted: 2023-02-07

## 2023-04-11 PROBLEM — I10 ESSENTIAL (PRIMARY) HYPERTENSION: Chronic | Status: ACTIVE | Noted: 2023-02-07

## 2023-05-01 DIAGNOSIS — I47.29 OTHER VENTRICULAR TACHYCARDIA: ICD-10-CM

## 2023-05-01 DIAGNOSIS — R07.9 CHEST PAIN, UNSPECIFIED: ICD-10-CM

## 2023-05-01 DIAGNOSIS — I10 ESSENTIAL (PRIMARY) HYPERTENSION: ICD-10-CM

## 2023-05-01 DIAGNOSIS — R00.1 BRADYCARDIA, UNSPECIFIED: ICD-10-CM

## 2023-05-01 DIAGNOSIS — E11.9 TYPE 2 DIABETES MELLITUS W/OUT COMPLICATIONS: ICD-10-CM

## 2023-05-01 DIAGNOSIS — E78.5 HYPERLIPIDEMIA, UNSPECIFIED: ICD-10-CM

## 2023-05-01 RX ORDER — FINASTERIDE 5 MG/1
5 TABLET, FILM COATED ORAL DAILY
Qty: 90 | Refills: 3 | Status: ACTIVE | COMMUNITY

## 2023-05-01 RX ORDER — DESVENLAFAXINE SUCCINATE 50 MG/1
50 TABLET, EXTENDED RELEASE ORAL
Refills: 0 | Status: ACTIVE | COMMUNITY

## 2023-05-01 RX ORDER — LANSOPRAZOLE 30 MG/1
30 CAPSULE, DELAYED RELEASE ORAL
Qty: 30 | Refills: 0 | Status: ACTIVE | COMMUNITY

## 2023-05-01 RX ORDER — GLIMEPIRIDE 1 MG/1
1 TABLET ORAL DAILY
Refills: 0 | Status: ACTIVE | COMMUNITY

## 2023-05-01 RX ORDER — AMLODIPINE BESYLATE 2.5 MG/1
2.5 TABLET ORAL DAILY
Qty: 90 | Refills: 3 | Status: ACTIVE | COMMUNITY

## 2023-05-01 RX ORDER — ALPRAZOLAM 0.5 MG/1
0.5 TABLET ORAL
Qty: 90 | Refills: 0 | Status: ACTIVE | COMMUNITY

## 2023-05-01 RX ORDER — HYDROCHLOROTHIAZIDE 25 MG/1
25 TABLET ORAL DAILY
Qty: 30 | Refills: 0 | Status: ACTIVE | COMMUNITY

## 2023-05-01 RX ORDER — ATORVASTATIN CALCIUM 20 MG/1
20 TABLET, FILM COATED ORAL
Qty: 30 | Refills: 0 | Status: ACTIVE | COMMUNITY

## 2023-05-02 ENCOUNTER — APPOINTMENT (OUTPATIENT)
Dept: CARDIOLOGY | Facility: CLINIC | Age: 78
End: 2023-05-02
Payer: MEDICARE

## 2023-05-02 ENCOUNTER — NON-APPOINTMENT (OUTPATIENT)
Age: 78
End: 2023-05-02

## 2023-05-02 VITALS
BODY MASS INDEX: 37.22 KG/M2 | OXYGEN SATURATION: 95 % | SYSTOLIC BLOOD PRESSURE: 99 MMHG | DIASTOLIC BLOOD PRESSURE: 64 MMHG | WEIGHT: 260 LBS | HEART RATE: 71 BPM | HEIGHT: 70 IN

## 2023-05-02 DIAGNOSIS — I25.10 ATHEROSCLEROTIC HEART DISEASE OF NATIVE CORONARY ARTERY W/OUT ANGINA PECTORIS: ICD-10-CM

## 2023-05-02 PROCEDURE — 99214 OFFICE O/P EST MOD 30 MIN: CPT

## 2023-05-02 PROCEDURE — 93000 ELECTROCARDIOGRAM COMPLETE: CPT

## 2023-05-02 NOTE — HISTORY OF PRESENT ILLNESS
[FreeTextEntry1] : 78 year old who presented 2/8/23 with sxs and a cath showed moderate, nonobstructive cad.  No chest pain or sob.  Echo showed normal lv function.  Walks a block with some dyspnea.  Used to smoke.  Stopped 2 months ago.  Does not follow a diet.

## 2023-08-17 ENCOUNTER — APPOINTMENT (OUTPATIENT)
Dept: PULMONOLOGY | Facility: CLINIC | Age: 78
End: 2023-08-17
Payer: MEDICARE

## 2023-08-17 VITALS
HEIGHT: 70 IN | OXYGEN SATURATION: 95 % | WEIGHT: 260 LBS | TEMPERATURE: 98 F | DIASTOLIC BLOOD PRESSURE: 58 MMHG | BODY MASS INDEX: 37.22 KG/M2 | SYSTOLIC BLOOD PRESSURE: 118 MMHG | HEART RATE: 57 BPM

## 2023-08-17 DIAGNOSIS — J44.9 CHRONIC OBSTRUCTIVE PULMONARY DISEASE, UNSPECIFIED: ICD-10-CM

## 2023-08-17 DIAGNOSIS — Z23 ENCOUNTER FOR IMMUNIZATION: ICD-10-CM

## 2023-08-17 PROCEDURE — G0009: CPT

## 2023-08-17 PROCEDURE — 94727 GAS DIL/WSHOT DETER LNG VOL: CPT

## 2023-08-17 PROCEDURE — 94060 EVALUATION OF WHEEZING: CPT

## 2023-08-17 PROCEDURE — 90677 PCV20 VACCINE IM: CPT

## 2023-08-17 PROCEDURE — 94729 DIFFUSING CAPACITY: CPT

## 2023-08-17 PROCEDURE — 99204 OFFICE O/P NEW MOD 45 MIN: CPT | Mod: 25

## 2023-08-21 NOTE — HISTORY OF PRESENT ILLNESS
[Current] : current [>= 20 pack years] : >= 20 pack years [TextBox_4] : 08/17/2023 [Nakul]: Asked to evaluate patient by Dr Marli Mcpherson for shortness of breath. 77 y/o M with worsening shortness of breath for past 6 months. No history of lung disease such as asthma or COPD, no hospitalizations for PE, pneumonia, or intubations. No cough. Shortness of breath worse with walking. He has a significant smoking history, about 3 packs/day for about 50 years, and still smokes half pack/day. History of chronically swollen LE.  [ESS] : 0

## 2023-08-21 NOTE — CONSULT LETTER
[Dear  ___] : Dear  [unfilled], [Courtesy Letter:] : I had the pleasure of seeing your patient, [unfilled], in my office today. [Please see my note below.] : Please see my note below. [Consult Closing:] : Thank you very much for allowing me to participate in the care of this patient.  If you have any questions, please do not hesitate to contact me. [Sincerely,] : Sincerely, [FreeTextEntry2] : Marli Mcpherson  [address] [place] [address] [FreeTextEntry3] : Wandy Lindo MD, [facility]

## 2023-09-08 NOTE — PHYSICAL EXAM
177.9 [No Acute Distress] : no acute distress [No JVD] : no jvd [Normal Rate/Rhythm] : normal rate/rhythm [Normal S1, S2] : normal s1, s2 [No Murmurs] : no murmurs [No Acc Muscle Use] : no acc muscle use [TextBox_2] : Morbidly obese [TextBox_68] : Decreased air entry at bases [TextBox_105] : Significant bilateral pedal edema to knees

## 2023-09-21 RX ORDER — UMECLIDINIUM BROMIDE AND VILANTEROL TRIFENATATE 62.5; 25 UG/1; UG/1
62.5-25 POWDER RESPIRATORY (INHALATION) DAILY
Qty: 1 | Refills: 0 | Status: DISCONTINUED | COMMUNITY
Start: 2023-08-17 | End: 2023-09-21

## 2023-11-13 ENCOUNTER — APPOINTMENT (OUTPATIENT)
Dept: PULMONOLOGY | Facility: CLINIC | Age: 78
End: 2023-11-13
Payer: MEDICARE

## 2023-11-13 VITALS
HEART RATE: 60 BPM | DIASTOLIC BLOOD PRESSURE: 68 MMHG | TEMPERATURE: 98.3 F | WEIGHT: 270 LBS | OXYGEN SATURATION: 95 % | BODY MASS INDEX: 38.65 KG/M2 | HEIGHT: 70 IN | SYSTOLIC BLOOD PRESSURE: 102 MMHG

## 2023-11-13 PROCEDURE — 99213 OFFICE O/P EST LOW 20 MIN: CPT

## 2024-03-21 RX ORDER — TIOTROPIUM BROMIDE AND OLODATEROL 3.124; 2.736 UG/1; UG/1
2.5-2.5 SPRAY, METERED RESPIRATORY (INHALATION)
Qty: 3 | Refills: 3 | Status: ACTIVE | COMMUNITY
Start: 2023-09-21 | End: 1900-01-01